# Patient Record
Sex: MALE | Race: WHITE
[De-identification: names, ages, dates, MRNs, and addresses within clinical notes are randomized per-mention and may not be internally consistent; named-entity substitution may affect disease eponyms.]

---

## 2018-08-09 ENCOUNTER — HOSPITAL ENCOUNTER (EMERGENCY)
Dept: HOSPITAL 13 - EME | Age: 63
Discharge: HOME | End: 2018-08-09
Payer: OTHER GOVERNMENT

## 2018-08-09 VITALS — WEIGHT: 190.04 LBS | BODY MASS INDEX: 26.6 KG/M2 | HEIGHT: 71 IN

## 2018-08-09 VITALS — SYSTOLIC BLOOD PRESSURE: 130 MMHG | DIASTOLIC BLOOD PRESSURE: 78 MMHG

## 2018-08-09 DIAGNOSIS — Z98.890: ICD-10-CM

## 2018-08-09 DIAGNOSIS — N32.0: ICD-10-CM

## 2018-08-09 DIAGNOSIS — Z90.49: ICD-10-CM

## 2018-08-09 DIAGNOSIS — N40.1: ICD-10-CM

## 2018-08-09 DIAGNOSIS — R33.9: Primary | ICD-10-CM

## 2018-08-09 DIAGNOSIS — N35.9: ICD-10-CM

## 2018-08-09 LAB
ALBUMIN SERPL-MCNC: 4.2 G/DL (ref 3.2–4.8)
ALP SERPL-CCNC: 77 IU/L (ref 3–129)
ALT (GPT): 15 IU/L (ref 3–49)
ANA TITR SER IF: (no result) /HPF (ref 0–5)
AST (GOT): 24 IU/L (ref 2–34)
BACTERIA #/AREA URNS HPF: (no result) /HPF
BASOPHILS # BLD AUTO: 15.6 G/DL (ref 12.5–16.6)
BILIRUBIN: NEGATIVE
BLOOD: (no result)
CHLORIDE: 107 MEQ/L (ref 99–109)
COLOR UR: (no result)
CREATININE: 1.2 MG/DL (ref 0.6–1.3)
D DIMER PPP FEU-MCNC: 100
EPITHELIAL CELLS: (no result) /HPF
GFR SERPLBLD CREATININE-BSD FMLAMALE: > 59 ML/MIN/ (ref 58.99–99999)
GLUCOSE (STRIP): 50
GLUCOSE SERPL-MCNC: 175 MG/DL (ref 70–99)
HCO3 BLD-SCNC: 22 MEQ/L (ref 20–31)
HCT VFR BLD CALC: 43.6 % (ref 38–50)
IRON SERPL-MCNC: NEGATIVE UG/DL
LDLC SERPL DIRECT ASSAY-MCNC: (no result) MG/DL
MCH RBC QN AUTO: 30.4 PG (ref 29–34)
MCV: 84.8 FL (ref 86–99)
MONOCYTES # BLD MANUAL: 35.8 G/DL (ref 30–36)
MUCOUS THREADS #/AREA URNS LPF: (no result) /LPF
NITRITE: NEGATIVE
NRBC (%): 0 /100 WBC (ref 0–0)
PLATELET COUNT: 227 K/UL (ref 156–360)
POTASSIUM SERPL-SCNC: 3.8 MEQ/L (ref 3.7–5.4)
RBC # BLD AUTO: 5.14 M/UL (ref 4–5.5)
RBC DIS.WIDTH-CV: 13.2 % (ref 11.8–14.6)
RBC DIS.WIDTH-SD: 40.8 % (ref 39–53)
RED BLOOD CELLS: (no result) /HPF (ref 0–5)
SODIUM: 141 MEQ/L (ref 136–147)
SPECIFIC GRAVITY: 1.01 (ref 1–1.03)
TOTAL BILIRUBIN: 0.8 MG/DL (ref 0–1)
TOTAL PROTEIN: 8.4 G/DL (ref 6.4–8.3)
UREA NITROGEN (BUN): 10 MG/DL (ref 9–23)
UROBILINOGEN UR-MCNC: 0.2 MG/DL (ref 0.2–1)
WBC # BLD AUTO: 11.2 K/UL (ref 4.1–10.2)
WBC # BLD AUTO: NEGATIVE 10*3/UL

## 2018-08-20 ENCOUNTER — HOSPITAL ENCOUNTER (EMERGENCY)
Dept: HOSPITAL 13 - EME | Age: 63
Discharge: HOME | End: 2018-08-20
Payer: OTHER GOVERNMENT

## 2018-08-20 VITALS — SYSTOLIC BLOOD PRESSURE: 143 MMHG | DIASTOLIC BLOOD PRESSURE: 75 MMHG

## 2018-08-20 VITALS — HEIGHT: 71 IN | WEIGHT: 184.97 LBS | BODY MASS INDEX: 25.9 KG/M2

## 2018-08-20 DIAGNOSIS — N40.0: ICD-10-CM

## 2018-08-20 DIAGNOSIS — M79.1: ICD-10-CM

## 2018-08-20 DIAGNOSIS — N30.01: Primary | ICD-10-CM

## 2018-08-20 DIAGNOSIS — R19.7: ICD-10-CM

## 2018-08-20 DIAGNOSIS — G47.00: ICD-10-CM

## 2018-08-20 DIAGNOSIS — Z90.49: ICD-10-CM

## 2018-08-20 LAB
ALBUMIN SERPL-MCNC: 3.8 G/DL (ref 3.2–4.8)
ALP SERPL-CCNC: 68 IU/L (ref 3–129)
ALT (GPT): 13 IU/L (ref 3–49)
ANA TITR SER IF: (no result) /HPF (ref 0–5)
AST (GOT): 16 IU/L (ref 2–34)
BACTERIA #/AREA URNS HPF: (no result) /HPF
BASOPHILS # BLD AUTO: 14.3 G/DL (ref 12.5–16.6)
BILIRUB DIRECT SERPL-MCNC: 0.3 MG/DL (ref 0–0.3)
BILIRUBIN: NEGATIVE
BLOOD: (no result)
CHLORIDE: 108 MEQ/L (ref 99–109)
COLOR UR: YELLOW
CREATINE KINASE: 203 IU/L (ref 1–294)
CREATININE: 1 MG/DL (ref 0.6–1.3)
D DIMER PPP FEU-MCNC: >=500
EPITHELIAL CELLS: (no result) /HPF
GFR SERPLBLD CREATININE-BSD FMLAMALE: > 59 ML/MIN/ (ref 58.99–99999)
GLUCOSE (STRIP): 50
GLUCOSE SERPL-MCNC: 144 MG/DL (ref 70–99)
HCO3 BLD-SCNC: 23 MEQ/L (ref 20–31)
HCT VFR BLD CALC: 41 % (ref 38–50)
IRON SERPL-MCNC: 20 UG/DL
LDLC SERPL DIRECT ASSAY-MCNC: (no result) MG/DL
LIPASE SERPL-CCNC: 27 U/L (ref 1–51)
MCH RBC QN AUTO: 30.2 PG (ref 29–34)
MCV: 86.5 FL (ref 86–99)
MONOCYTES # BLD MANUAL: 34.9 G/DL (ref 30–36)
MUCOUS THREADS #/AREA URNS LPF: (no result) /LPF
NITRITE: NEGATIVE
NRBC (%): 0 /100 WBC (ref 0–0)
PLATELET COUNT: 272 K/UL (ref 156–360)
POTASSIUM SERPL-SCNC: 3.3 MEQ/L (ref 3.7–5.4)
RBC # BLD AUTO: 4.74 M/UL (ref 4–5.5)
RBC DIS.WIDTH-CV: 13.2 % (ref 11.8–14.6)
RBC DIS.WIDTH-SD: 41.4 % (ref 39–53)
RED BLOOD CELLS: (no result) /HPF (ref 0–5)
SODIUM: 143 MEQ/L (ref 136–147)
SPECIFIC GRAVITY: 1.02 (ref 1–1.03)
TOTAL BILIRUBIN: 0.8 MG/DL (ref 0–1)
TOTAL PROTEIN: 7.1 G/DL (ref 6.4–8.3)
UREA NITROGEN (BUN): 14 MG/DL (ref 9–23)
UROBILINOGEN UR-MCNC: 0.2 MG/DL (ref 0.2–1)
WBC # BLD AUTO: 11.9 K/UL (ref 4.1–10.2)
WBC # BLD AUTO: NEGATIVE 10*3/UL

## 2020-02-24 ENCOUNTER — APPOINTMENT (OUTPATIENT)
Age: 65
Setting detail: DERMATOLOGY
End: 2020-02-24

## 2020-02-24 DIAGNOSIS — L57.0 ACTINIC KERATOSIS: ICD-10-CM

## 2020-02-24 DIAGNOSIS — L72.8 OTHER FOLLICULAR CYSTS OF THE SKIN AND SUBCUTANEOUS TISSUE: ICD-10-CM

## 2020-02-24 DIAGNOSIS — D22 MELANOCYTIC NEVI: ICD-10-CM

## 2020-02-24 PROBLEM — D48.5 NEOPLASM OF UNCERTAIN BEHAVIOR OF SKIN: Status: ACTIVE | Noted: 2020-02-24

## 2020-02-24 PROCEDURE — 11105 PUNCH BX SKIN EA SEP/ADDL: CPT

## 2020-02-24 PROCEDURE — OTHER MIPS QUALITY: OTHER

## 2020-02-24 PROCEDURE — OTHER PRESCRIPTION: OTHER

## 2020-02-24 PROCEDURE — 11104 PUNCH BX SKIN SINGLE LESION: CPT

## 2020-02-24 PROCEDURE — 99202 OFFICE O/P NEW SF 15 MIN: CPT | Mod: 25

## 2020-02-24 PROCEDURE — OTHER BIOPSY BY PUNCH METHOD: OTHER

## 2020-02-24 PROCEDURE — OTHER CONSULTATION EXCISION: OTHER

## 2020-02-24 RX ORDER — TRIAMCINOLONE ACETONIDE 1 MG/G
0.1% CREAM TOPICAL BID
Qty: 1 | Refills: 0 | Status: ERX | COMMUNITY
Start: 2020-02-24

## 2020-02-24 ASSESSMENT — LOCATION DETAILED DESCRIPTION DERM
LOCATION DETAILED: RIGHT DISTAL DORSAL FOREARM
LOCATION DETAILED: RIGHT DISTAL DORSAL FOREARM
LOCATION DETAILED: RIGHT SUPERIOR PARIETAL SCALP
LOCATION DETAILED: RIGHT SUPERIOR PARIETAL SCALP
LOCATION DETAILED: LEFT DISTAL DORSAL FOREARM
LOCATION DETAILED: LEFT SUPERIOR LATERAL LOWER BACK
LOCATION DETAILED: LEFT DISTAL DORSAL FOREARM

## 2020-02-24 ASSESSMENT — LOCATION SIMPLE DESCRIPTION DERM
LOCATION SIMPLE: RIGHT FOREARM
LOCATION SIMPLE: LEFT FOREARM
LOCATION SIMPLE: LEFT LOWER BACK
LOCATION SIMPLE: RIGHT FOREARM
LOCATION SIMPLE: SCALP
LOCATION SIMPLE: LEFT FOREARM
LOCATION SIMPLE: SCALP

## 2020-02-24 ASSESSMENT — LOCATION ZONE DERM
LOCATION ZONE: ARM
LOCATION ZONE: TRUNK
LOCATION ZONE: ARM
LOCATION ZONE: SCALP
LOCATION ZONE: SCALP

## 2020-02-24 NOTE — PROCEDURE: MIPS QUALITY
Quality 131: Pain Assessment And Follow-Up: Pain assessment using a standardized tool is documented as negative, no follow-up plan required
Quality 226: Preventive Care And Screening: Tobacco Use: Screening And Cessation Intervention: Patient screened for tobacco use and is an ex/non-smoker
Quality 130: Documentation Of Current Medications In The Medical Record: Current Medications Documented
Quality 402: Tobacco Use And Help With Quitting Among Adolescents: Patient screened for tobacco and never smoked
Detail Level: Detailed
Quality 110: Preventive Care And Screening: Influenza Immunization: Influenza Immunization not Administered because Patient Refused.
Quality 431: Preventive Care And Screening: Unhealthy Alcohol Use - Screening: Patient screened for unhealthy alcohol use using a single question and scores less than 2 times per year
Quality 111:Pneumonia Vaccination Status For Older Adults: Pneumococcal Vaccination not Administered or Previously Received, Reason not Otherwise Specified

## 2020-02-24 NOTE — PROCEDURE: BIOPSY BY PUNCH METHOD
Biopsy Type: H and E
Home Suture Removal Text: Patient was provided a home suture removal kit and will remove their sutures at home.  If they have any questions or difficulties they will call the office.
Bill For Surgical Tray: no
Anesthesia Volume In Cc (Will Not Render If 0): 0.5
Billing Type: Third-Party Bill
Epidermal Sutures: 3-0 Ethilon
Additional Anesthesia Volume In Cc (Will Not Render If 0): 0
Post-Care Instructions: I reviewed with the patient in detail post-care instructions. Patient is to keep the biopsy site dry overnight, and then apply bacitracin twice daily until healed. Patient may apply hydrogen peroxide soaks to remove any crusting.
Punch Size In Mm: 3
Was A Bandage Applied: Yes
Wound Care: Petrolatum
Consent: Written consent was obtained and risks were reviewed including but not limited to scarring, infection, bleeding, scabbing, incomplete removal, nerve damage and allergy to anesthesia.
Detail Level: Detailed
Dressing: bandage
Suture Removal: 14 days
Anesthesia Type: 1% lidocaine with epinephrine
Notification Instructions: Patient will be notified of biopsy results. However, patient instructed to call the office if not contacted within 2 weeks.
Hemostasis: None

## 2020-03-09 ENCOUNTER — APPOINTMENT (OUTPATIENT)
Age: 65
Setting detail: DERMATOLOGY
End: 2020-03-09

## 2020-03-09 DIAGNOSIS — L21.8 OTHER SEBORRHEIC DERMATITIS: ICD-10-CM

## 2020-03-09 DIAGNOSIS — L91.8 OTHER HYPERTROPHIC DISORDERS OF THE SKIN: ICD-10-CM

## 2020-03-09 DIAGNOSIS — L43.8 OTHER LICHEN PLANUS: ICD-10-CM

## 2020-03-09 PROBLEM — D48.5 NEOPLASM OF UNCERTAIN BEHAVIOR OF SKIN: Status: ACTIVE | Noted: 2020-03-09

## 2020-03-09 PROCEDURE — OTHER REASSURANCE: OTHER

## 2020-03-09 PROCEDURE — OTHER PRESCRIPTION: OTHER

## 2020-03-09 PROCEDURE — OTHER SHAVE REMOVAL: OTHER

## 2020-03-09 PROCEDURE — OTHER COUNSELING: OTHER

## 2020-03-09 PROCEDURE — 99213 OFFICE O/P EST LOW 20 MIN: CPT | Mod: 25

## 2020-03-09 PROCEDURE — OTHER MIPS QUALITY: OTHER

## 2020-03-09 PROCEDURE — OTHER PRESCRIPTION MEDICATION MANAGEMENT: OTHER

## 2020-03-09 PROCEDURE — 11305 SHAVE SKIN LESION 0.5 CM/<: CPT

## 2020-03-09 RX ORDER — CLOBETASOL PROPIONATE 0.5 MG/ML
0.05% SOLUTION TOPICAL QD
Qty: 1 | Refills: 0 | Status: ERX | COMMUNITY
Start: 2020-03-09

## 2020-03-09 RX ORDER — KETOCONAZOLE 20 MG/ML
2% SHAMPOO TOPICAL QD
Qty: 1 | Refills: 0 | Status: ERX | COMMUNITY
Start: 2020-03-09

## 2020-03-09 RX ORDER — DESONIDE 0.5 MG/G
0.05% CREAM TOPICAL QD
Qty: 1 | Refills: 0 | Status: ERX | COMMUNITY
Start: 2020-03-09

## 2020-03-09 ASSESSMENT — LOCATION SIMPLE DESCRIPTION DERM
LOCATION SIMPLE: RIGHT SCALP
LOCATION SIMPLE: LEFT CHEEK
LOCATION SIMPLE: POSTERIOR NECK

## 2020-03-09 ASSESSMENT — LOCATION DETAILED DESCRIPTION DERM
LOCATION DETAILED: LEFT INFERIOR CENTRAL MALAR CHEEK
LOCATION DETAILED: LEFT LATERAL TRAPEZIAL NECK
LOCATION DETAILED: RIGHT CENTRAL FRONTAL SCALP

## 2020-03-09 ASSESSMENT — LOCATION ZONE DERM
LOCATION ZONE: NECK
LOCATION ZONE: SCALP
LOCATION ZONE: FACE

## 2020-03-09 NOTE — PROCEDURE: PRESCRIPTION MEDICATION MANAGEMENT
Detail Level: Zone
Render In Strict Bullet Format?: No
Continue Regimen: Triamcinolone cream as needed

## 2020-03-09 NOTE — PROCEDURE: SHAVE REMOVAL
Render Post-Care Instructions In Note?: no
Biopsy Method: Dermablade
Anesthesia Type: 1% lidocaine with epinephrine
Medical Necessity Clause: This procedure was medically necessary because the lesion that was treated was:
Post-Care Instructions: I reviewed with the patient in detail post-care instructions. Patient is to keep the biopsy site dry overnight, and then apply bacitracin twice daily until healed. Patient may apply hydrogen peroxide soaks to remove any crusting.
Consent was obtained from the patient. The risks and benefits to therapy were discussed in detail. Specifically, the risks of infection, scarring, bleeding, prolonged wound healing, incomplete removal, allergy to anesthesia, nerve injury and recurrence were addressed. Prior to the procedure, the treatment site was clearly identified and confirmed by the patient. All components of Universal Protocol/PAUSE Rule completed.
Notification Instructions: Patient will be notified of biopsy results. However, patient instructed to call the office if not contacted within 2 weeks.
Was A Bandage Applied: Yes
Medical Necessity Information: It is in your best interest to select a reason for this procedure from the list below. All of these items fulfill various CMS LCD requirements except the new and changing color options.
Hemostasis: Drysol
Wound Care: Petrolatum
Billing Type: Third-Party Bill
X Size Of Lesion In Cm (Optional): 0
Detail Level: Detailed
Path Notes (To The Dermatopathologist): Please check margins.

## 2020-03-09 NOTE — PROCEDURE: MIPS QUALITY
Quality 131: Pain Assessment And Follow-Up: Pain assessment using a standardized tool is documented as negative, no follow-up plan required
Detail Level: Detailed
Quality 111:Pneumonia Vaccination Status For Older Adults: Pneumococcal Vaccination not Administered or Previously Received, Reason not Otherwise Specified
Quality 431: Preventive Care And Screening: Unhealthy Alcohol Use - Screening: Patient screened for unhealthy alcohol use using a single question and scores less than 2 times per year
Quality 130: Documentation Of Current Medications In The Medical Record: Current Medications Documented
Quality 110: Preventive Care And Screening: Influenza Immunization: Influenza Immunization not Administered because Patient Refused.
Quality 402: Tobacco Use And Help With Quitting Among Adolescents: Patient screened for tobacco and never smoked
Quality 226: Preventive Care And Screening: Tobacco Use: Screening And Cessation Intervention: Patient screened for tobacco use and is an ex/non-smoker